# Patient Record
Sex: MALE | Race: WHITE
[De-identification: names, ages, dates, MRNs, and addresses within clinical notes are randomized per-mention and may not be internally consistent; named-entity substitution may affect disease eponyms.]

---

## 2020-10-15 ENCOUNTER — HOSPITAL ENCOUNTER (EMERGENCY)
Dept: HOSPITAL 72 - EMR | Age: 25
Discharge: HOME | End: 2020-10-15
Payer: COMMERCIAL

## 2020-10-15 VITALS — BODY MASS INDEX: 27.28 KG/M2 | HEIGHT: 68 IN | WEIGHT: 180 LBS

## 2020-10-15 VITALS — SYSTOLIC BLOOD PRESSURE: 150 MMHG | DIASTOLIC BLOOD PRESSURE: 94 MMHG

## 2020-10-15 VITALS — DIASTOLIC BLOOD PRESSURE: 86 MMHG | SYSTOLIC BLOOD PRESSURE: 142 MMHG

## 2020-10-15 DIAGNOSIS — F11.10: ICD-10-CM

## 2020-10-15 DIAGNOSIS — T40.1X1A: Primary | ICD-10-CM

## 2020-10-15 DIAGNOSIS — J45.909: ICD-10-CM

## 2020-10-15 PROCEDURE — 99281 EMR DPT VST MAYX REQ PHY/QHP: CPT

## 2020-10-15 NOTE — NUR
ED Nurse Note:



pt biba from street with CO OD on heroin. Pt VSS, pt awake, aao x 4, ambulates 
with steady gait. Per EMS, pt awoke during  without being roused. Pt 
states he smoked heroin 2-3 hrs ago. Pt states that he feels he is at his 
baseline at this time, able to hold conversation without difficulty. Pt states 
the he would like to refuse blood draw d/t cx pt to having s/p sz (hx of). ERMD 
at bedside, aware. Pt requested water in order to produce urine sample. ERMD 
aware. Pt given water. Awaiting further orders. will continue to monitor.

## 2020-10-15 NOTE — EMERGENCY ROOM REPORT
History of Present Illness


General


Chief Complaint:  Overdose


Source:  Patient


 (Ana Dior INES)





Present Illness


HPI


22-year-old male history of heroin use by ambulance after smoking heroin prior 

to arrival.  He denies any coingestants, drinking, pills or any other symptoms. 

EMS states that he did not require Narcan.  He is completely alert and oriented 

at this time.  He has no complaints.


The patient's symptoms were gradual onset, severity was moderate, duration since

6 hours.  


Quality: Sleepy





Past medical history: Asthma


Past surgical history:  Denies





Smoking:  Denies


Alcohol use:  Denies


Drug use: Heroin.  Denies IV drug use





Review of systems:


CONST: No fevers or chills, No night sweats


PULMONARY: No productive cough,  No shortness of breath 


CARDIAC: No chest pain, No palpitations 


GI: No vomiting, No diarrhea , No melena_or_BRBPR 


: No dysuria, No hematuria, No discharge 


NEURO: No new_focal_weakness_or_numbness, No confusion, No vision changes


14 point Review of Systems is otherwise negative except per HPI





Physical Exam:


GENERAL: Awake_alert_ nontoxic, no acute distress Spo2 100% on RA -normal


EYES: Extraocular muscles are intact. Conjunctivae clear. Lids without swelling


ENT: External nose and ear normal_in_appearance. Oropharynx clear. 

Head_atraumatic, Moist_oral_mucosa


NECK:  No JVD. No meningismus. No thyromegaly.  Supple. Trachea midline.  No 

nuchal rigidity.


RESP: Normal respiratory effort. Symmetric rise. No stridor. 

Clear_to_auscultation_No_rales_No_wheezes


CARDIAC: Regular rate and regular rhytm. No_significant pedal edema.


ABDOMEN: Soft. Nondistended.  Nontender_No_rebound_or_guarding.


MSK:  Normal muscle tone, without rigidity.  Extremities without asymmetric 

deformity or swelling.  


SKIN: Warm and dry.  No visible cyanosis or pallor.  No petechia


NEUROLOGIC: Alert, oriented x3.  Motor_and_sensation_grossly_intact. No truncal 

ataxia. Gait_normal


Psych: Normal mood and affect, normal judgment and insight











- COORDINATION OF CARE


Case was discussed with: Patient 


Any labs that were ordered were interpreted as part of the medical decision 

making:








Medical Decision Making/Plan:





DX: Heroin use versus bronchospasm versus wheezing





25-year-old male here after heroin prior to arrival.  He is alert and oriented 

x4.  No focal neurologic deficits.  Afebrile.  No nuchal rigidity.





Care to be signed out to Dr Walker pending UDS


 (Ana Dior D.O.)


Allergies:  


Coded Allergies:  


     No Known Allergies (Unverified , 10/15/20)





COVID-19 Screening


Contact w/high risk pt:  No


Experienced COVID-19 symptoms?:  No


COVID-19 Testing performed PTA:  No


 (Ana Dior D.O.)





Nursing Documentation-[facility]


Hx Asthma:  Yes


 (Ana Dior D.O.)





Physical Exam





Vital Signs








  Date Time  Temp Pulse Resp B/P (MAP) Pulse Ox O2 Delivery O2 Flow Rate FiO2


 


10/15/20 20:22 98.4 110 19 150/94 (112) 98 Room Air  








Sp02 EP Interpretation:  reviewed, normal


 (Ana Dior D.O.)





Medical Decision Making


Diagnostic Impression:  


   Primary Impression:  


   Drug overdose


   Additional Impression:  


   Heroin abuse


ER Course


Assumed care of the patient from the previous provider at approximately 2220.  


Please refer to initial note for full history and physical exam.





Briefly, 25-year-old male history of heroin use presents for evaluation of 

altered mental status.  The patient admits to using heroin earlier but does not 

inject and denies other coingestants.  States did not fully pass out was just 

sleepy and brought in for evaluation by EMS.  He refused blood draw and states 

he does not want to provide urine.  He has been monitored in the emergency 

department for 2 hours no evidence of respiratory depression.  He did not 

require Narcan or any other interventions.  He is stable and appropriate for 

outpatient follow-up.  He states he follows with [facility] outpatient substance 

abuse counseling.  He is called a friend to take him home.  He is medically 

stable and appropriate for outpatient follow-up.  Counseled him on dangers of 

substance abuse and again provided resources in his discharge paperwork.  

Instructed to return with new or worsening symptoms.  He understands and agrees 

with this treatment plan.


 (Frank Walker MD)





Last Vital Signs








  Date Time  Temp Pulse Resp B/P (MAP) Pulse Ox O2 Delivery O2 Flow Rate FiO2


 


10/15/20 20:25 98.4 110 19 150/94 98 Room Air  








 (Ana Dior D.O.)


Disposition:  HOME, SELF-CARE


Condition:  Stable


Referrals:  


NOT CHOSEN IPA/MD,REFERRING (PCP)











Ana Dior D.O.           Oct 15, 2020 22:13


Frank Walker MD              Oct 15, 2020 22:22

## 2020-10-15 NOTE — NUR
ER DISCHARGE NOTE:

Patient is cleared to be discharged home per ERMD, pt is aox4, 99% on room air, 
with stable vital signs. pt was given dc and prescription instructions, pt was 
able to verbalize understanding, pt id band removed without complications. pt 
is able to ambulate with steady gait. pt took all belongings.